# Patient Record
Sex: MALE | Race: WHITE | Employment: STUDENT | URBAN - NONMETROPOLITAN AREA
[De-identification: names, ages, dates, MRNs, and addresses within clinical notes are randomized per-mention and may not be internally consistent; named-entity substitution may affect disease eponyms.]

---

## 2019-04-23 ENCOUNTER — OFFICE VISIT (OUTPATIENT)
Dept: ORTHOPEDIC SURGERY | Age: 19
End: 2019-04-23
Payer: COMMERCIAL

## 2019-04-23 VITALS — HEIGHT: 71 IN | BODY MASS INDEX: 21 KG/M2 | WEIGHT: 150 LBS

## 2019-04-23 DIAGNOSIS — M25.561 RIGHT KNEE PAIN, UNSPECIFIED CHRONICITY: Primary | ICD-10-CM

## 2019-04-23 DIAGNOSIS — S82.141A CLOSED FRACTURE OF RIGHT TIBIAL PLATEAU, INITIAL ENCOUNTER: ICD-10-CM

## 2019-04-23 PROCEDURE — 99203 OFFICE O/P NEW LOW 30 MIN: CPT | Performed by: ORTHOPAEDIC SURGERY

## 2019-04-23 NOTE — PROGRESS NOTES
Chief Complaint    Knee Pain (right knee)      History of Present Illness:  Tushar Dinh is a 22 y.o. male. He is here for evaluation of his right knee. He injured his right knee last week when he fell off of a balcony last week from about 20-25 feet. He states he, curled himself up when he was falling does not remember exactly how he landed. He was having pain within the right knee after he did land. He is seen in the emergency department in [address] [place] [address] where he was [address] at the time of the injury did have an MRI done which demonstrated a nondisplaced tibial plateau fracture which extended through the tibial spines. He also did demonstrate bone bruising within the medial and lateral femoral condyles. She is here today for further follow-up. He's been on crutches partial weightbearing since time of injury. He is also been using a knee immobilizer           Medical History:  Patient's medications, allergies, past medical, surgical, social and family histories were reviewed and updated as appropriate. Review of Systems:  Pertinent items are noted in HPI  Review of systems reviewed from Patient History Form dated on 4/23/19 and available in the patient's chart under the Media tab. Vital Signs:  Ht 5' 11\" (1.803 m)   Wt 150 lb (68 kg)   BMI 20.92 kg/m²     General Exam:   Constitutional: Patient is adequately groomed with no evidence of malnutrition  DTRs: Deep tendon reflexes are intact  Mental Status: The patient is oriented to time, place and person. The patient's mood and affect are appropriate. Knee Examination:    Inspection:  There are some small abrasions about his right knee in his right leg. No significant swelling or ecchymosis noted about the right knee    Palpation:  Very mild palpable effusion within the right knee today.   No significant tenderness along the joint lines    Range of Motion:  Extension is 5° short of 0, knee flexion today was 120°    Strength:  He is able to do a straight leg raise    Special Tests:  Negative Lachman exam.  No instability to varus and valgus stress testing. Negative posterior drawer    Skin: There are no rashes, ulcerations or lesions. Gait: He with an antalgic gait with the use of crutches      Additional Comments:       Additional Examinations:         Left Lower Extremity: Examination of the left lower extremity does not show any tenderness, deformity or injury. Range of motion is unremarkable. There is no gross instability. There are no rashes, ulcerations or lesions. Strength and tone are normal.    Radiology:     X-rays obtained and reviewed in office:  Views 4 views of the right knee demonstrates no obvious fracture dislocation or other osseous abnormalities         Assessment :  Right knee nondisplaced tibial plateau fracture with bony bruising within the medial and lateral femoral condyles    Impression:  Encounter Diagnoses   Name Primary?  Right knee pain, unspecified chronicity Yes    Closed fracture of right tibial plateau, initial encounter        Office Procedures:  Orders Placed This Encounter   Procedures    XR KNEE RIGHT (MIN 4 VIEWS)     Standing Status:   Future     Number of Occurrences:   1     Standing Expiration Date:   4/23/2020       Treatment Plan:  I discussed diagnosis and treatment options with him today. We did go through the results of his MRI and x-rays. I would recommend at this time continued use of crutches with partial weightbearing on his right lower extremity. I did discuss with him that he should be on his crutches at all times when he is up and ambulating. He may discontinue use of the knee immobilizer. I would like to see him back in 5 weeks with a repeat x-ray of his right knee. If doing well at that time we can discontinue crutch immobilization and start get him fully weightbearing and possibly get him in therapy.